# Patient Record
Sex: MALE | Race: BLACK OR AFRICAN AMERICAN
[De-identification: names, ages, dates, MRNs, and addresses within clinical notes are randomized per-mention and may not be internally consistent; named-entity substitution may affect disease eponyms.]

---

## 2022-11-17 ENCOUNTER — HOSPITAL ENCOUNTER (EMERGENCY)
Dept: HOSPITAL 97 - ER | Age: 32
Discharge: HOME | End: 2022-11-17
Payer: SELF-PAY

## 2022-11-17 VITALS — SYSTOLIC BLOOD PRESSURE: 105 MMHG | DIASTOLIC BLOOD PRESSURE: 59 MMHG | OXYGEN SATURATION: 100 %

## 2022-11-17 VITALS — TEMPERATURE: 99.5 F

## 2022-11-17 DIAGNOSIS — U07.1: Primary | ICD-10-CM

## 2022-11-17 PROCEDURE — 87804 INFLUENZA ASSAY W/OPTIC: CPT

## 2022-11-17 PROCEDURE — 99283 EMERGENCY DEPT VISIT LOW MDM: CPT

## 2022-11-17 PROCEDURE — 87811 SARS-COV-2 COVID19 W/OPTIC: CPT

## 2022-11-17 PROCEDURE — 36415 COLL VENOUS BLD VENIPUNCTURE: CPT

## 2022-11-17 NOTE — EDPHYS
Physician Documentation                                                                           

 [facility]                                                                 

Name: Siomara Calixto                                                                             

Age: 32 yrs                                                                                       

Sex: Male                                                                                         

: 1990                                                                                   

MRN: [id number]                                                                                   

Arrival Date: 2022                                                                          

Time: 07:42                                                                                       

Account#: [id number]                                                                            

Bed 18                                                                                            

Private MD:                                                                                       

ED Physician Kofi Billingsley                                                                      

HPI:                                                                                              

                                                                                             

08:20 This 32 yrs old Black Male presents to ER via Ambulatory with complaints of Vomiting,   jh7 

      Flu Symptoms.                                                                               

08:20 The patient presents to the emergency department with nausea, that is mild, vomiting,   jh7 

      that is intermittent, 2 times since the onset of symptoms. Onset: The symptoms/episode      

      began/occurred 3 day(s) ago. Associated signs and symptoms: Pertinent positives: fever,     

      Body aches, chills, Pertinent negatives: abdominal pain, dysuria, GI bleeding,              

      hematuria. 32-year-old male presents for flulike symptoms. Reports vomiting, chills,        

      and body aches for 3 days. The patient is alert and oriented and denies any other           

      complaints at this time. No PMH, no allergies..                                             

                                                                                                  

Historical:                                                                                       

- Allergies:                                                                                      

08:19 No Known Allergies;                                                                     bp  

- Home Meds:                                                                                      

08:19 None [Active];                                                                          bp  

- PMHx:                                                                                           

08:19 None;                                                                                   bp  

                                                                                                  

- Immunization history:: Adult Immunizations up to date.                                          

- Social history:: Smoking status: unknown.                                                       

                                                                                                  

                                                                                                  

ROS:                                                                                              

08:20 Eyes: Negative for injury, pain, redness, and discharge, ENT: Negative for injury,      jh7 

      pain, and discharge, Cardiovascular: Negative for chest pain, palpitations, and edema,      

      Respiratory: Negative for shortness of breath, cough, wheezing, and pleuritic chest         

      pain, Back: Negative for injury and pain, MS/Extremity: Negative for injury and             

      deformity, Skin: Negative for injury, rash, and discoloration, Neuro: Negative for          

      headache, weakness, numbness, tingling, and seizure.                                        

08:20 Constitutional: Positive for body aches, chills, fever.                                     

08:20 Abdomen/GI: Positive for nausea, vomiting, Negative for abdominal pain, diarrhea.           

08:20 All other systems are negative.                                                             

                                                                                                  

Exam:                                                                                             

08:20 Constitutional:  This is a well developed, well nourished patient who is awake, alert,  jh7 

      and in no acute distress. Head/Face:  Normocephalic, atraumatic. Eyes:  Pupils equal        

      round and reactive to light, extra-ocular motions intact.  Lids and lashes normal.          

      Conjunctiva and sclera are non-icteric and not injected.  Cornea within normal limits.      

      Periorbital areas with no swelling, redness, or edema. ENT:  Nares patent. No nasal         

      discharge, no septal abnormalities noted.  Tympanic membranes are normal and external       

      auditory canals are clear.  Oropharynx with no redness, swelling, or masses, exudates,      

      or evidence of obstruction, uvula midline.  Mucous membranes moist. Neck:  Trachea          

      midline, no thyromegaly or masses palpated, and no cervical lymphadenopathy.  Supple,       

      full range of motion without nuchal rigidity, or vertebral point tenderness.  No            

      Meningismus. Cardiovascular:  Regular rate and rhythm with a normal S1 and S2.  No          

      gallops, murmurs, or rubs.  Normal PMI, no JVD.  No pulse deficits. Respiratory:  Lungs     

      have equal breath sounds bilaterally, clear to auscultation and percussion.  No rales,      

      rhonchi or wheezes noted.  No increased work of breathing, no retractions or nasal          

      flaring. Abdomen/GI:  Soft, non-tender, with normal bowel sounds.  No distension or         

      tympany.  No guarding or rebound.  No evidence of tenderness throughout. Back:  No          

      spinal tenderness.  No costovertebral tenderness.  Full range of motion. Skin:  Warm,       

      dry with normal turgor.  Normal color with no rashes, no lesions, and no evidence of        

      cellulitis. MS/ Extremity:  Pulses equal, no cyanosis.  Neurovascular intact.  Full,        

      normal range of motion. Neuro:  Awake and alert, GCS 15, oriented to person, place,         

      time, and situation.  Motor strength 5/5 in all extremities.  Sensory grossly intact.       

      Normal gait.                                                                                

                                                                                                  

Vital Signs:                                                                                      

08:18  / 76; Pulse 75; Resp 18; Temp 99.5; Pulse Ox 98% ; Weight 65.77 kg; Height 5 ft. bp  

      7 in. (170.18 cm);                                                                          

08:56  / 59; Pulse 85; Resp 16; Pulse Ox 100% ;                                         bp  

08:18 Body Mass Index 22.71 (65.77 kg, 170.18 cm)                                             bp  

                                                                                                  

MDM:                                                                                              

08:01 Patient medically screened.                                                             7 

08:44 ED course: Patient passed p.o. challenge successfully.                                  [facility] 

08:53 Differential diagnosis: viral gastroenteritis, Flu, COVID. Data reviewed: vital signs,  [facility] 

      nurses notes, lab test result(s). Data interpreted: Pulse oximetry: is 98 %.                

      Interpretation: normal. Counseling: I had a detailed discussion with the patient and/or     

      guardian regarding: the historical points, exam findings, and any diagnostic results        

      supporting the discharge/admit diagnosis, to return to the emergency department if          

      symptoms worsen or persist or if there are any questions or concerns that arise at home.    

                                                                                                  

                                                                                             

08:18 Order name: Flu; Complete Time: 09:04                                                   [facility] 

                                                                                             

08:18 Order name: SARS RAPID; Complete Time: 08:56                                            [facility] 

                                                                                             

08:28 Order name: PO challenge; Complete Time: 08:54                                          [facility] 

                                                                                                  

Administered Medications:                                                                         

08:30 Drug: Ondansetron 4 mg Route: PO;                                                       bp  

08:54 Follow up: Response: No adverse reaction                                                bp  

08:30 Drug: Tylenol 650 mg Route: PO;                                                         bp  

08:54 Follow up: Response: No adverse reaction                                                bp  

                                                                                                  

                                                                                                  

Disposition:                                                                                      

09:50 Co-signature as Attending Physician, Kofi Billingsley MD I agree with the assessment and  larry 

      plan of care.                                                                               

                                                                                                  

Disposition Summary:                                                                              

22 08:56                                                                                    

Discharge Ordered                                                                                 

      Location: Home                                                                          [facility] 

      Problem: new                                                                            [facility] 

      Symptoms: have improved                                                                 [facility] 

      Condition: Stable                                                                       [facility] 

      Diagnosis                                                                                   

        - Coronavirus infection, unspecified                                                  [facility] 

      Followup:                                                                               [facility] 

        - With: Private Physician                                                                  

        - When: 2 - 3 days                                                                         

        - Reason: Recheck today's complaints                                                       

      Discharge Instructions:                                                                     

        - Discharge Summary Sheet                                                               

        - COVID-19                                                                            [facility] 

        - COVID-19 Frequently Asked Questions                                                 [facility] 

        - 10 Things You Can Do to Manage Your COVID-19 Symptoms at Home - [place] [address] 

      Forms:                                                                                      

        - Work release form                                                                   bp  

        - Medication Reconciliation Form                                                      [facility] 

        - Thank You Letter                                                                    [facility] 

      Prescriptions:                                                                              

        - ondansetron 4 mg Oral tablet,disintegrating                                              

            - place 1 tablet by TRANSLINGUAL route 4 times per day As needed; 20 tablet;      [facility] 

      Refills: 0, Product Selection Permitted                                                     

Signatures:                                                                                       

Dispatcher [employer]                           Kofi Armenta MD MD cha Peltier, Brian, RN                      RN   bp                                                   

Merly Torres, URSULAP                   FNP  [facility]                                                  

                                                                                                  

**************************************************************************************************

## 2022-11-17 NOTE — ER
Nurse's Notes                                                                                     

 [facility]                                                                 

Name: Siomara Calixto                                                                             

Age: 32 yrs                                                                                       

Sex: Male                                                                                         

: 1990                                                                                   

MRN: [id number]                                                                                   

Arrival Date: 2022                                                                          

Time: 07:42                                                                                       

Account#: [id number]                                                                            

Bed 18                                                                                            

Private MD:                                                                                       

Diagnosis: Coronavirus infection, unspecified                                                     

                                                                                                  

Presentation:                                                                                     

                                                                                             

08:18 Chief complaint: Patient states: NAUSEA/VOMITING AND MYALGIA x3 DAYS WITH CHILLS.       bp  

      Coronavirus screen: chills, headache, nausea, vomiting. Client presents with at least       

      one sign or symptom that may indicate coronavirus-19. Standard/surgical mask placed on      

      the client. Provider contacted for isolation considerations. Ebola Screen: No symptoms      

      or risks identified at this time. Initial Sepsis Screen: Does the patient meet any 2        

      criteria? No. Patient's initial sepsis screen is negative. Does the patient have a          

      suspected source of infection? No. Patient's initial sepsis screen is negative. Risk        

      Assessment: Do you want to hurt yourself or someone else? Patient reports no desire to      

      harm self or others. Onset of symptoms is unknown.                                          

08:18 Method Of Arrival: Ambulatory                                                           bp  

08:18 Acuity: TITA 3                                                                           bp  

                                                                                                  

Triage Assessment:                                                                                

08:20 General: Appears in no apparent distress. ill, Behavior is calm, cooperative,           bp  

      appropriate for age. Pain: Denies pain. EENT: No deficits noted. Neuro: No deficits         

      noted. Cardiovascular: No deficits noted. Respiratory: No deficits noted. GI: Reports       

      nausea, vomiting. : No signs and/or symptoms were reported regarding the                  

      genitourinary system. Derm: No deficits noted. Musculoskeletal: No deficits noted.          

                                                                                                  

Historical:                                                                                       

- Allergies:                                                                                      

08:19 No Known Allergies;                                                                     bp  

- Home Meds:                                                                                      

08:19 None [Active];                                                                          bp  

- PMHx:                                                                                           

08:19 None;                                                                                   bp  

                                                                                                  

- Immunization history:: Adult Immunizations up to date.                                          

- Social history:: Smoking status: unknown.                                                       

                                                                                                  

                                                                                                  

Screenin:20 Abuse screen: Denies threats or abuse. Denies injuries from another. Nutritional        bp  

      screening: No deficits noted. Tuberculosis screening: No symptoms or risk factors           

      identified. Fall Risk None identified.                                                      

                                                                                                  

Assessment:                                                                                       

08:20 General: SEE TRIAGE NOTE.                                                               bp  

08:54 Reassessment: PO CHALLENGE SUCCESSFUL. GI: Abdomen is non-distended.                    bp  

09:47 Reassessment: PT DC HOME AMBULATORY WITH FAMILY, DX WITH CORONAVIRUS.                   bp  

                                                                                                  

Vital Signs:                                                                                      

08:18  / 76; Pulse 75; Resp 18; Temp 99.5; Pulse Ox 98% ; Weight 65.77 kg; Height 5 ft. bp  

      7 in. (170.18 cm);                                                                          

08:56  / 59; Pulse 85; Resp 16; Pulse Ox 100% ;                                         bp  

08:18 Body Mass Index 22.71 (65.77 kg, 170.18 cm)                                             bp  

                                                                                                  

ED Course:                                                                                        

07:42 Patient arrived in ED.                                                                  am2 

08:01 [facility]                                                          [id number] 

08:01 Kofi Billingsley MD is Attending Physician.                                             jh 

08:17 Vinod Snyder, RN is Primary Nurse.                                                    bp  

08:19 Triage completed.                                                                       bp  

08:19 Arm band placed on.                                                                     bp  

08:20 Patient has correct armband on for positive identification. Bed in low position. Call   bp  

      light in reach. Side rails up X2.                                                           

08:56 No provider procedures requiring assistance completed. Patient did not have IV access   bp  

      during this emergency room visit.                                                           

                                                                                                  

Administered Medications:                                                                         

08:30 Drug: Ondansetron 4 mg Route: PO;                                                       bp  

08:54 Follow up: Response: No adverse reaction                                                bp  

08:30 Drug: Tylenol 650 mg Route: PO;                                                         bp  

08:54 Follow up: Response: No adverse reaction                                                bp  

                                                                                                  

                                                                                                  

Medication:                                                                                       

08:20 VIS not applicable for this client.                                                     bp  

                                                                                                  

Outcome:                                                                                          

08:56 Discharge ordered by MD.                                                                [facility] 

09:47 Discharged to home ambulatory, with family.                                             bp  

09:47 Condition: stable                                                                           

09:47 Discharge instructions given to patient, Instructed on discharge instructions, follow       

      up and referral plans. medication usage, Demonstrated understanding of instructions,        

      follow-up care, medications, Prescriptions given X 1.                                       

09:48 Patient left the ED.                                                                    bp  

                                                                                                  

Signatures:                                                                                       

Suki Koch                               am2                                                  

Vinod Snyder, RN                      RN   bp                                                   

Merly Torres FNP                   P  [facility]                                                  

                                                                                                  

**************************************************************************************************